# Patient Record
Sex: FEMALE | Race: ASIAN | NOT HISPANIC OR LATINO | Employment: OTHER | URBAN - METROPOLITAN AREA
[De-identification: names, ages, dates, MRNs, and addresses within clinical notes are randomized per-mention and may not be internally consistent; named-entity substitution may affect disease eponyms.]

---

## 2017-07-19 ENCOUNTER — OFFICE VISIT (OUTPATIENT)
Dept: OTOLARYNGOLOGY | Facility: CLINIC | Age: 62
End: 2017-07-19
Payer: MEDICARE

## 2017-07-19 ENCOUNTER — CLINICAL SUPPORT (OUTPATIENT)
Dept: AUDIOLOGY | Facility: CLINIC | Age: 62
End: 2017-07-19
Payer: MEDICARE

## 2017-07-19 VITALS — BODY MASS INDEX: 24.14 KG/M2 | WEIGHT: 131.19 LBS | HEIGHT: 62 IN

## 2017-07-19 DIAGNOSIS — H90.12 CONDUCTIVE HEARING LOSS OF LEFT EAR WITH UNRESTRICTED HEARING OF RIGHT EAR: Primary | ICD-10-CM

## 2017-07-19 DIAGNOSIS — H66.92 CHRONIC OTITIS MEDIA OF LEFT EAR: Primary | ICD-10-CM

## 2017-07-19 PROCEDURE — 99204 OFFICE O/P NEW MOD 45 MIN: CPT | Mod: S$PBB,,, | Performed by: OTOLARYNGOLOGY

## 2017-07-19 PROCEDURE — 99999 PR PBB SHADOW E&M-EST. PATIENT-LVL III: CPT | Mod: PBBFAC,,, | Performed by: OTOLARYNGOLOGY

## 2017-07-19 PROCEDURE — 99213 OFFICE O/P EST LOW 20 MIN: CPT | Mod: PBBFAC | Performed by: OTOLARYNGOLOGY

## 2017-07-19 RX ORDER — CALCITRIOL 0.5 UG/1
0.5 CAPSULE ORAL EVERY MORNING
COMMUNITY

## 2017-07-19 RX ORDER — CARVEDILOL 6.25 MG/1
6.25 TABLET ORAL 2 TIMES DAILY WITH MEALS
COMMUNITY

## 2017-07-19 RX ORDER — SEVELAMER HYDROCHLORIDE 800 MG/1
800 TABLET, FILM COATED ORAL
COMMUNITY

## 2017-07-19 RX ORDER — AMITRIPTYLINE HYDROCHLORIDE 25 MG/1
25 TABLET, FILM COATED ORAL NIGHTLY PRN
COMMUNITY

## 2017-07-19 RX ORDER — AMLODIPINE BESYLATE 10 MG/1
10 TABLET ORAL EVERY MORNING
COMMUNITY

## 2017-07-19 NOTE — LETTER
July 19, 2017      Rubén Burton MD  [address] [facility]  [address]  [facility] [address]           [facility] - Otorhinolaryngology  [address]  [facility] [address]  Phone: [phone]  Fax: [phone]          Patient: Tor Perez   MR Number: [id number]   YOB: 1955   Date of Visit: 7/19/2017       Dear Dr. Rubén Burton:    Thank you for referring Tor Perez to me for evaluation. Attached you will find relevant portions of my assessment and plan of care.    If you have questions, please do not hesitate to call me. I look forward to following Tor Perez along with you.    Sincerely,    Reji Shetty MD    Enclosure  CC:  No Recipients    If you would like to receive this communication electronically, please contact [email or web address] or [phone] to request more information on [facility] access.    For providers and/or their staff who would like to refer a patient to [facility], please contact us through our one-stop-shop provider referral line, [facility], at [phone].    If you feel you have received this communication in error or would no longer like to receive these types of communications, please e-mail [email or web address]

## 2017-07-19 NOTE — PROGRESS NOTES
Subjective:       Patient ID: Tor Perez is a 61 y.o. female.    Chief Complaint: Otitis Media    HPI: Ref Dr Burton.    Long Hx of L COM.    Unable to insufflate AS.    Gets painful rec L otorrhea.    Wants it repaired.    CT done in May during active drainage.    Hx of Kindey transplant/ DM.    Sees MD's in [facility].    Past Medical History: Patient has a past medical history of Disorder of kidney and ureter; Hypertension; and Stroke.    Past Surgical History: Patient has a past surgical history that includes Kidney transplant.    Social History: Patient reports that she has never smoked. She does not have any smokeless tobacco history on file. She reports that she does not drink alcohol.    Family History: family history is not on file.    Medications:   Current Outpatient Prescriptions   Medication Sig    amitriptyline (ELAVIL) 25 MG tablet Take 25 mg by mouth nightly as needed for Insomnia.    amlodipine (NORVASC) 10 MG tablet Take 10 mg by mouth once daily.    calcitRIOL (ROCALTROL) 0.5 MCG Cap Take 0.5 mcg by mouth once daily.    carvedilol (COREG) 6.25 MG tablet Take 6.25 mg by mouth 2 (two) times daily with meals.    sevelamer HCl (RENAGEL) 800 MG Tab Take 800 mg by mouth 3 (three) times daily with meals.     No current facility-administered medications for this visit.        Allergies: Patient has No Known Allergies.    Review of Systems   Constitutional: Negative for activity change, appetite change, chills, diaphoresis, fatigue, fever and unexpected weight change.   HENT: Positive for ear pain and hearing loss. Negative for congestion, ear discharge, facial swelling, nosebleeds, postnasal drip, rhinorrhea, sinus pressure, sneezing, sore throat, tinnitus, trouble swallowing and voice change.    Eyes: Negative for photophobia, pain, discharge, redness and visual disturbance.   Respiratory: Negative for cough, chest tightness, shortness of breath and wheezing.    Cardiovascular: Negative for chest  pain and palpitations.   Gastrointestinal: Negative for abdominal pain, constipation, diarrhea and nausea.   Genitourinary: Negative for dysuria and frequency.   Musculoskeletal: Negative for arthralgias, back pain, gait problem, joint swelling, myalgias, neck pain and neck stiffness.   Skin: Negative for color change, pallor and rash.   Neurological: Negative for dizziness, tremors, seizures, syncope, facial asymmetry, speech difficulty, weakness, light-headedness, numbness and headaches.   Hematological: Negative for adenopathy. Does not bruise/bleed easily.   Psychiatric/Behavioral: Negative for agitation, confusion, decreased concentration, dysphoric mood and sleep disturbance. The patient is not nervous/anxious and is not hyperactive.        Objective:      Physical Exam   Constitutional: She is oriented to person, place, and time. She appears well-developed and well-nourished. She is cooperative.  Non-toxic appearance. She does not have a sickly appearance. She does not appear ill. No distress.   HENT:   Head: Normocephalic and atraumatic. Not macrocephalic and not microcephalic. Head is without raccoon's eyes, without Acosta's sign, without abrasion, without contusion, without laceration, without right periorbital erythema and without left periorbital erythema. Hair is normal.   Right Ear: Ear canal normal. No lacerations. No drainage, swelling or tenderness. No foreign bodies. No mastoid tenderness. Tympanic membrane is not injected, not scarred, not perforated, not erythematous, not retracted and not bulging. Tympanic membrane mobility is normal. No middle ear effusion. No hemotympanum. Decreased hearing is noted.   Left Ear: Ear canal normal. No lacerations. No drainage, swelling or tenderness. No foreign bodies. No mastoid tenderness. Tympanic membrane is scarred and perforated. Tympanic membrane is not injected, not erythematous, not retracted and not bulging. Tympanic membrane mobility is normal.  No  middle ear effusion. No hemotympanum. Decreased hearing is noted.   Ears:    Nose: No mucosal edema, rhinorrhea, nose lacerations, sinus tenderness, nasal deformity, septal deviation or nasal septal hematoma. No epistaxis.  No foreign bodies. Right sinus exhibits no maxillary sinus tenderness. Left sinus exhibits no maxillary sinus tenderness.       CT from May with some scant fluid in ME/ air cells.    Sclerosis.       Eyes: Conjunctivae, EOM and lids are normal. Pupils are equal, round, and reactive to light.   Neck: Normal range of motion. Neck supple. No JVD present. No tracheal tenderness and no muscular tenderness present. No neck rigidity. No tracheal deviation, no edema, no erythema and normal range of motion present. No thyroid mass and no thyromegaly present.   Cardiovascular: Normal rate and regular rhythm.    Pulmonary/Chest: Effort normal. No accessory muscle usage or stridor. No apnea, no tachypnea and no bradypnea. No respiratory distress.   Abdominal: Soft. Normal appearance.   Musculoskeletal: Normal range of motion.   Lymphadenopathy:        Head (right side): No submental, no submandibular, no tonsillar, no preauricular and no posterior auricular adenopathy present.        Head (left side): No submental, no submandibular, no tonsillar, no preauricular and no posterior auricular adenopathy present.     She has no cervical adenopathy.        Right cervical: No superficial cervical, no deep cervical and no posterior cervical adenopathy present.       Left cervical: No superficial cervical, no deep cervical and no posterior cervical adenopathy present.   Neurological: She is alert and oriented to person, place, and time. She displays no atrophy and no tremor. No cranial nerve deficit or sensory deficit. She exhibits normal muscle tone. She displays no seizure activity.   Skin: Skin is warm, dry and intact. No abrasion, no bruising, no burn, no ecchymosis, no laceration, no lesion and no rash noted.  She is not diaphoretic. No erythema. No pallor.   Psychiatric: She has a normal mood and affect. Her behavior is normal. Judgment and thought content normal. Her speech is not rapid and/or pressured and not slurred. Cognition and memory are normal. She is communicative.   Nursing note and vitals reviewed.              Assessment:       1. Chronic otitis media of left ear     2. ? ETD AS  Plan:         Discussed L Tplasty with pt and family.  They desire correction.    OP / Gen    Lat graft.    Some risk of ETD/ need for PET.    Needs pre op clearance for Med issues.    I have explained the risks , benefits and alternatives of the procedure in detail. This includes infection, bleeding, further loss of hearing,tinnitus, failure to improve, chorda symptoms, dizziness and facial nerve problems. All questions have been answered.  The patient desires to proceed.

## 2017-07-19 NOTE — PROGRESS NOTES
Borderline normal to mild sensorineural hearing loss with type A tympanogram in the right ear.  Moderate mixed hearing loss with a flat, large ear canal volume tympanogram in the left ear.  Patient does not speak enough English to do accurate word discrimination testing.

## 2017-07-21 ENCOUNTER — TELEPHONE (OUTPATIENT)
Dept: OTOLARYNGOLOGY | Facility: CLINIC | Age: 62
End: 2017-07-21

## 2017-07-21 DIAGNOSIS — H66.92 LEFT OTITIS MEDIA WITH SPONTANEOUS RUPTURE OF EARDRUM: Primary | ICD-10-CM

## 2017-07-21 DIAGNOSIS — H72.92 LEFT OTITIS MEDIA WITH SPONTANEOUS RUPTURE OF EARDRUM: Primary | ICD-10-CM

## 2017-09-06 ENCOUNTER — ANESTHESIA EVENT (OUTPATIENT)
Dept: SURGERY | Facility: HOSPITAL | Age: 62
End: 2017-09-06
Payer: MEDICARE

## 2017-09-06 ENCOUNTER — TELEPHONE (OUTPATIENT)
Dept: OTOLARYNGOLOGY | Facility: CLINIC | Age: 62
End: 2017-09-06

## 2017-09-06 NOTE — ANESTHESIA PREPROCEDURE EVALUATION
09/06/2017  Pre-operative evaluation for Procedure(s) (LRB):  TYMPANOPLASTY (Left)    Tor Perez is a 61 y.o. female with PMH of HTN, stroke, s/p kidney transplant who presents for above procedure.     LDA:     Prev airway: None on file     Drips:     There is no problem list on file for this patient.      Review of patient's allergies indicates:  No Known Allergies     No current facility-administered medications on file prior to encounter.      Current Outpatient Prescriptions on File Prior to Encounter   Medication Sig Dispense Refill    amitriptyline (ELAVIL) 25 MG tablet Take 25 mg by mouth nightly as needed for Insomnia.      amlodipine (NORVASC) 10 MG tablet Take 10 mg by mouth every morning.       calcitRIOL (ROCALTROL) 0.5 MCG Cap Take 0.5 mcg by mouth every morning.       carvedilol (COREG) 6.25 MG tablet Take 6.25 mg by mouth 2 (two) times daily with meals.      sevelamer HCl (RENAGEL) 800 MG Tab Take 800 mg by mouth 3 (three) times daily with meals.         Past Surgical History:   Procedure Laterality Date    KIDNEY TRANSPLANT         Social History     Social History    Marital status:      Spouse name: N/A    Number of children: N/A    Years of education: N/A     Occupational History    Not on file.     Social History Main Topics    Smoking status: Never Smoker    Smokeless tobacco: Not on file    Alcohol use No    Drug use: Unknown    Sexual activity: Not on file     Other Topics Concern    Not on file     Social History Narrative    No narrative on file         Vital Signs Range (Last 24H):         CBC: No results for input(s): WBC, RBC, HGB, HCT, PLT, MCV, MCH, MCHC in the last 72 hours.    CMP: No results for input(s): NA, K, CL, CO2, BUN, CREATININE, GLU, MG, PHOS, CALCIUM, ALBUMIN, PROT, ALKPHOS, ALT, AST, BILITOT in the last 72 hours.    INR  No results for  input(s): INR, PROTIME, APTT in the last 72 hours.    Invalid input(s): PT        Diagnostic Studies:    CT orbits 05/22/17:   1.  There is trace fluid and mucosal thickening of the paranasal sinuses demonstrated as well as inferior posterior aspect of master air cells on the left.  2.  There is relatively symmetric overall appearance of the external auditory canals with some marginal soft tissue thickening at the medial most aspect on the left.  3.  The right tympanic membrane appears grossly intact overall.  4.  There is incomplete visualization of the left tympanic membrane with adjacent thickening and soft tissue attenuation along with some minimal fluid at the inferior aspect of the middle auditory meatus and abutting the inferior lateral aspect of the ossicles asymmetric as compared to the right.  This may correspond to the clinical history provided as could be seen with sequela of previous injury or inflammation.    EKG: None       2D Echo: None        Anesthesia Evaluation    I have reviewed the Patient Summary Reports.        Review of Systems  Anesthesia Hx:  History of prior surgery of interest to airway management or planning: Denies Family Hx of Anesthesia complications.   Denies Personal Hx of Anesthesia complications.   Social:  Non-Smoker    Cardiovascular:   Hypertension    Neurological:   CVA        Physical Exam  General:  Well nourished    Airway/Jaw/Neck:  Airway Findings: Mouth Opening: Normal Tongue: Normal  General Airway Assessment: Adult  Mallampati: II  TM Distance: 4 - 6 cm      Dental:  Dental Findings: In tact   Chest/Lungs:  Chest/Lungs Clear    Heart/Vascular:  Heart Findings: Normal Heart murmur: negative       Mental Status:  Mental Status Findings: Normal        Anesthesia Plan  Type of Anesthesia, risks & benefits discussed:  Anesthesia Type:  general  Patient's Preference:   Intra-op Monitoring Plan: standard ASA monitors  Intra-op Monitoring Plan Comments:   Post Op Pain Control  Plan: IV/PO Opioids PRN  Post Op Pain Control Plan Comments:   Induction:   IV  Beta Blocker:  Patient is on a Beta-Blocker and has received one dose within the past 24 hours (No further documentation required).       Informed Consent: Patient understands risks and agrees with Anesthesia plan.  Questions answered. Anesthesia consent signed with patient.  ASA Score: 2     Day of Surgery Review of History & Physical:    H&P update referred to the surgeon.     Anesthesia Plan Notes: On peritoneal dialysis, was dialyzed overnight.         Ready For Surgery From Anesthesia Perspective.

## 2017-09-06 NOTE — PRE-PROCEDURE INSTRUCTIONS
Spoke with Patient's Daughter.  NPO, medication, and pre-op instructions reviewed.  Denies previous problems with Anesthesia.  Patient speaks Cameroonian -  requested.  Daughter does not know the dose of her Sensipar off hand.  Daughter verbalized understanding of instructions.

## 2017-09-07 ENCOUNTER — SURGERY (OUTPATIENT)
Age: 62
End: 2017-09-07

## 2017-09-07 ENCOUNTER — ANESTHESIA (OUTPATIENT)
Dept: SURGERY | Facility: HOSPITAL | Age: 62
End: 2017-09-07
Payer: MEDICARE

## 2017-09-07 ENCOUNTER — HOSPITAL ENCOUNTER (OUTPATIENT)
Facility: HOSPITAL | Age: 62
Discharge: HOME | End: 2017-09-07
Attending: OTOLARYNGOLOGY | Admitting: OTOLARYNGOLOGY
Payer: MEDICARE

## 2017-09-07 VITALS
OXYGEN SATURATION: 96 % | HEIGHT: 62 IN | WEIGHT: 130 LBS | DIASTOLIC BLOOD PRESSURE: 56 MMHG | TEMPERATURE: 97 F | SYSTOLIC BLOOD PRESSURE: 125 MMHG | RESPIRATION RATE: 14 BRPM | BODY MASS INDEX: 23.92 KG/M2 | HEART RATE: 67 BPM

## 2017-09-07 DIAGNOSIS — H72.90 TYMPANIC MEMBRANE PERFORATION: ICD-10-CM

## 2017-09-07 DIAGNOSIS — H72.90 TYMPANIC MEMBRANE PERFORATION, UNSPECIFIED LATERALITY: Primary | ICD-10-CM

## 2017-09-07 DIAGNOSIS — H72.92 TYMPANIC MEMBRANE PERFORATION, LEFT: ICD-10-CM

## 2017-09-07 LAB
POCT GLUCOSE: 130 MG/DL (ref 70–110)
POCT GLUCOSE: 132 MG/DL (ref 70–110)

## 2017-09-07 PROCEDURE — 71000033 HC RECOVERY, INTIAL HOUR: Performed by: OTOLARYNGOLOGY

## 2017-09-07 PROCEDURE — 27201423 OPTIME MED/SURG SUP & DEVICES STERILE SUPPLY: Performed by: OTOLARYNGOLOGY

## 2017-09-07 PROCEDURE — 82962 GLUCOSE BLOOD TEST: CPT | Performed by: OTOLARYNGOLOGY

## 2017-09-07 PROCEDURE — 63600175 PHARM REV CODE 636 W HCPCS: Performed by: STUDENT IN AN ORGANIZED HEALTH CARE EDUCATION/TRAINING PROGRAM

## 2017-09-07 PROCEDURE — 25000003 PHARM REV CODE 250: Performed by: OTOLARYNGOLOGY

## 2017-09-07 PROCEDURE — 36000708 HC OR TIME LEV III 1ST 15 MIN: Performed by: OTOLARYNGOLOGY

## 2017-09-07 PROCEDURE — 71000015 HC POSTOP RECOV 1ST HR: Performed by: OTOLARYNGOLOGY

## 2017-09-07 PROCEDURE — 71000016 HC POSTOP RECOV ADDL HR: Performed by: OTOLARYNGOLOGY

## 2017-09-07 PROCEDURE — 37000008 HC ANESTHESIA 1ST 15 MINUTES: Performed by: OTOLARYNGOLOGY

## 2017-09-07 PROCEDURE — D9220A PRA ANESTHESIA: Mod: ,,, | Performed by: ANESTHESIOLOGY

## 2017-09-07 PROCEDURE — 36000709 HC OR TIME LEV III EA ADD 15 MIN: Performed by: OTOLARYNGOLOGY

## 2017-09-07 PROCEDURE — 25000003 PHARM REV CODE 250: Performed by: STUDENT IN AN ORGANIZED HEALTH CARE EDUCATION/TRAINING PROGRAM

## 2017-09-07 PROCEDURE — 37000009 HC ANESTHESIA EA ADD 15 MINS: Performed by: OTOLARYNGOLOGY

## 2017-09-07 PROCEDURE — 69631 REPAIR EARDRUM STRUCTURES: CPT | Mod: LT,GC,, | Performed by: OTOLARYNGOLOGY

## 2017-09-07 RX ORDER — FENTANYL CITRATE 50 UG/ML
25 INJECTION, SOLUTION INTRAMUSCULAR; INTRAVENOUS EVERY 5 MIN PRN
Status: DISCONTINUED | OUTPATIENT
Start: 2017-09-07 | End: 2017-09-07 | Stop reason: HOSPADM

## 2017-09-07 RX ORDER — SODIUM CHLORIDE 9 MG/ML
INJECTION, SOLUTION INTRAVENOUS CONTINUOUS PRN
Status: DISCONTINUED | OUTPATIENT
Start: 2017-09-07 | End: 2017-09-07

## 2017-09-07 RX ORDER — ONDANSETRON 2 MG/ML
4 INJECTION INTRAMUSCULAR; INTRAVENOUS DAILY PRN
Status: DISCONTINUED | OUTPATIENT
Start: 2017-09-07 | End: 2017-09-07 | Stop reason: HOSPADM

## 2017-09-07 RX ORDER — ROCURONIUM BROMIDE 10 MG/ML
INJECTION, SOLUTION INTRAVENOUS
Status: DISCONTINUED | OUTPATIENT
Start: 2017-09-07 | End: 2017-09-07

## 2017-09-07 RX ORDER — NEOMYCIN SULFATE, POLYMYXIN B SULFATE AND HYDROCORTISONE 10; 3.5; 1 MG/ML; MG/ML; [USP'U]/ML
SUSPENSION/ DROPS AURICULAR (OTIC)
Status: DISCONTINUED | OUTPATIENT
Start: 2017-09-07 | End: 2017-09-07 | Stop reason: HOSPADM

## 2017-09-07 RX ORDER — GLYCOPYRROLATE 0.2 MG/ML
INJECTION INTRAMUSCULAR; INTRAVENOUS
Status: DISCONTINUED | OUTPATIENT
Start: 2017-09-07 | End: 2017-09-07

## 2017-09-07 RX ORDER — AMOXICILLIN 500 MG/1
500 TABLET, FILM COATED ORAL EVERY 12 HOURS
Qty: 20 TABLET | Refills: 0 | Status: SHIPPED | OUTPATIENT
Start: 2017-09-07 | End: 2017-09-17

## 2017-09-07 RX ORDER — OXYCODONE AND ACETAMINOPHEN 7.5; 325 MG/1; MG/1
1 TABLET ORAL EVERY 4 HOURS PRN
Qty: 30 TABLET | Refills: 0 | Status: SHIPPED | OUTPATIENT
Start: 2017-09-07

## 2017-09-07 RX ORDER — HYDROMORPHONE HYDROCHLORIDE 1 MG/ML
1 INJECTION, SOLUTION INTRAMUSCULAR; INTRAVENOUS; SUBCUTANEOUS EVERY 4 HOURS PRN
Status: DISCONTINUED | OUTPATIENT
Start: 2017-09-07 | End: 2017-09-07 | Stop reason: HOSPADM

## 2017-09-07 RX ORDER — MECLIZINE HYDROCHLORIDE 25 MG/1
25 TABLET ORAL 3 TIMES DAILY PRN
Qty: 30 TABLET | Refills: 0 | Status: SHIPPED | OUTPATIENT
Start: 2017-09-07

## 2017-09-07 RX ORDER — PROMETHAZINE HYDROCHLORIDE 25 MG/1
25 TABLET ORAL EVERY 4 HOURS PRN
Qty: 20 TABLET | Refills: 0 | Status: SHIPPED | OUTPATIENT
Start: 2017-09-07

## 2017-09-07 RX ORDER — SODIUM CHLORIDE 0.9 % (FLUSH) 0.9 %
3 SYRINGE (ML) INJECTION
Status: DISCONTINUED | OUTPATIENT
Start: 2017-09-07 | End: 2017-09-07 | Stop reason: HOSPADM

## 2017-09-07 RX ORDER — HYDROCODONE BITARTRATE AND ACETAMINOPHEN 5; 325 MG/1; MG/1
1 TABLET ORAL EVERY 4 HOURS PRN
Status: DISCONTINUED | OUTPATIENT
Start: 2017-09-07 | End: 2017-09-07 | Stop reason: HOSPADM

## 2017-09-07 RX ORDER — PROPOFOL 10 MG/ML
VIAL (ML) INTRAVENOUS
Status: DISCONTINUED | OUTPATIENT
Start: 2017-09-07 | End: 2017-09-07

## 2017-09-07 RX ORDER — PHENYLEPHRINE HYDROCHLORIDE 10 MG/ML
INJECTION INTRAVENOUS
Status: DISCONTINUED | OUTPATIENT
Start: 2017-09-07 | End: 2017-09-07

## 2017-09-07 RX ORDER — SUCCINYLCHOLINE CHLORIDE 20 MG/ML
INJECTION INTRAMUSCULAR; INTRAVENOUS
Status: DISCONTINUED | OUTPATIENT
Start: 2017-09-07 | End: 2017-09-07

## 2017-09-07 RX ORDER — MIDAZOLAM HYDROCHLORIDE 1 MG/ML
INJECTION, SOLUTION INTRAMUSCULAR; INTRAVENOUS
Status: DISCONTINUED | OUTPATIENT
Start: 2017-09-07 | End: 2017-09-07

## 2017-09-07 RX ORDER — ONDANSETRON 2 MG/ML
INJECTION INTRAMUSCULAR; INTRAVENOUS
Status: DISCONTINUED | OUTPATIENT
Start: 2017-09-07 | End: 2017-09-07

## 2017-09-07 RX ORDER — NEOMYCIN SULFATE, POLYMYXIN B SULFATE, HYDROCORTISONE 3.5; 10000; 1 MG/ML; [USP'U]/ML; MG/ML
3 SOLUTION/ DROPS AURICULAR (OTIC) EVERY 8 HOURS
Qty: 10 ML | Refills: 0 | Status: SHIPPED | OUTPATIENT
Start: 2017-09-15 | End: 2017-09-25

## 2017-09-07 RX ORDER — FENTANYL CITRATE 50 UG/ML
INJECTION, SOLUTION INTRAMUSCULAR; INTRAVENOUS
Status: DISCONTINUED | OUTPATIENT
Start: 2017-09-07 | End: 2017-09-07

## 2017-09-07 RX ORDER — LIDOCAINE HYDROCHLORIDE AND EPINEPHRINE 20; 10 MG/ML; UG/ML
INJECTION, SOLUTION INFILTRATION; PERINEURAL
Status: DISCONTINUED | OUTPATIENT
Start: 2017-09-07 | End: 2017-09-07 | Stop reason: HOSPADM

## 2017-09-07 RX ORDER — NEOSTIGMINE METHYLSULFATE 1 MG/ML
INJECTION, SOLUTION INTRAVENOUS
Status: DISCONTINUED | OUTPATIENT
Start: 2017-09-07 | End: 2017-09-07

## 2017-09-07 RX ORDER — NEOMYCIN SULFATE, POLYMYXIN B SULFATE, HYDROCORTISONE 3.5; 10000; 1 MG/ML; [USP'U]/ML; MG/ML
3 SOLUTION/ DROPS AURICULAR (OTIC) EVERY 8 HOURS
Qty: 10 ML | Refills: 0 | Status: SHIPPED | OUTPATIENT
Start: 2017-09-15 | End: 2017-09-07

## 2017-09-07 RX ORDER — LIDOCAINE HCL/PF 100 MG/5ML
SYRINGE (ML) INTRAVENOUS
Status: DISCONTINUED | OUTPATIENT
Start: 2017-09-07 | End: 2017-09-07

## 2017-09-07 RX ORDER — CEFAZOLIN SODIUM 2 G/50ML
2 SOLUTION INTRAVENOUS
Status: DISCONTINUED | OUTPATIENT
Start: 2017-09-07 | End: 2017-09-07 | Stop reason: HOSPADM

## 2017-09-07 RX ADMIN — GLYCOPYRROLATE 0.6 MG: 0.2 INJECTION, SOLUTION INTRAMUSCULAR; INTRAVENOUS at 11:09

## 2017-09-07 RX ADMIN — EPHEDRINE SULFATE 5 MG: 50 INJECTION, SOLUTION INTRAMUSCULAR; INTRAVENOUS; SUBCUTANEOUS at 10:09

## 2017-09-07 RX ADMIN — SUCCINYLCHOLINE CHLORIDE 120 MG: 20 INJECTION, SOLUTION INTRAMUSCULAR; INTRAVENOUS at 09:09

## 2017-09-07 RX ADMIN — ROCURONIUM BROMIDE 10 MG: 10 INJECTION, SOLUTION INTRAVENOUS at 10:09

## 2017-09-07 RX ADMIN — FENTANYL CITRATE 100 MCG: 50 INJECTION, SOLUTION INTRAMUSCULAR; INTRAVENOUS at 09:09

## 2017-09-07 RX ADMIN — ROCURONIUM BROMIDE 25 MG: 10 INJECTION, SOLUTION INTRAVENOUS at 10:09

## 2017-09-07 RX ADMIN — ONDANSETRON 4 MG: 2 INJECTION INTRAMUSCULAR; INTRAVENOUS at 11:09

## 2017-09-07 RX ADMIN — ROCURONIUM BROMIDE 5 MG: 10 INJECTION, SOLUTION INTRAVENOUS at 09:09

## 2017-09-07 RX ADMIN — LIDOCAINE HYDROCHLORIDE 100 MG: 20 INJECTION, SOLUTION INTRAVENOUS at 09:09

## 2017-09-07 RX ADMIN — PHENYLEPHRINE HYDROCHLORIDE 100 MCG: 10 INJECTION INTRAVENOUS at 10:09

## 2017-09-07 RX ADMIN — NEOSTIGMINE METHYLSULFATE 5 MG: 1 INJECTION INTRAVENOUS at 11:09

## 2017-09-07 RX ADMIN — LIDOCAINE HYDROCHLORIDE AND EPINEPHRINE 5 ML: 20; 10 INJECTION, SOLUTION INFILTRATION; PERINEURAL at 10:09

## 2017-09-07 RX ADMIN — CEFAZOLIN SODIUM 2 G: 2 SOLUTION INTRAVENOUS at 10:09

## 2017-09-07 RX ADMIN — PROPOFOL 140 MG: 10 INJECTION, EMULSION INTRAVENOUS at 09:09

## 2017-09-07 RX ADMIN — NEOMYCIN SULFATE, POLYMYXIN B SULFATE AND HYDROCORTISONE 4 DROP: 10; 3.5; 1 SUSPENSION/ DROPS AURICULAR (OTIC) at 09:09

## 2017-09-07 RX ADMIN — SODIUM CHLORIDE, SODIUM GLUCONATE, SODIUM ACETATE, POTASSIUM CHLORIDE, MAGNESIUM CHLORIDE, SODIUM PHOSPHATE, DIBASIC, AND POTASSIUM PHOSPHATE: .53; .5; .37; .037; .03; .012; .00082 INJECTION, SOLUTION INTRAVENOUS at 11:09

## 2017-09-07 RX ADMIN — SODIUM CHLORIDE: 0.9 INJECTION, SOLUTION INTRAVENOUS at 09:09

## 2017-09-07 RX ADMIN — MIDAZOLAM HYDROCHLORIDE 1 MG: 1 INJECTION, SOLUTION INTRAMUSCULAR; INTRAVENOUS at 09:09

## 2017-09-07 RX ADMIN — HYDROCODONE BITARTRATE AND ACETAMINOPHEN 1 TABLET: 5; 325 TABLET ORAL at 12:09

## 2017-09-07 RX ADMIN — EPHEDRINE SULFATE 10 MG: 50 INJECTION, SOLUTION INTRAMUSCULAR; INTRAVENOUS; SUBCUTANEOUS at 11:09

## 2017-09-07 RX ADMIN — FENTANYL CITRATE 50 MCG: 50 INJECTION, SOLUTION INTRAMUSCULAR; INTRAVENOUS at 10:09

## 2017-09-07 RX ADMIN — GELATIN ABSORBABLE SPONGE SIZE 100 1 APPLICATOR: MISC at 09:09

## 2017-09-07 NOTE — BRIEF OP NOTE
[facility]  Brief Operative Note     SUMMARY     Surgery Date: 9/7/2017     Surgeon(s) and Role:     * Reji Shetty MD - Primary     * Brooke Villalba MD - Resident - Assisting      Pre-op Diagnosis:  Left otitis media with spontaneous rupture of eardrum [H66.92, H72.92]    Post-op Diagnosis:  Post-Op Diagnosis Codes:     * Left otitis media with spontaneous rupture of eardrum [H66.92, H72.92]    Procedure(s) (LRB):  TYMPANOPLASTY (Left)    Anesthesia: General    Description of the findings of the procedure: see operative note    Estimated Blood Loss: 20         Specimens:   Specimen (12h ago through future)    None          Discharge Note    SUMMARY     Admit Date: 9/7/2017    Discharge Date and Time:  09/07/2017 9:37 AM    Hospital Course (synopsis of major diagnoses, care, treatment, and services provided during the course of the hospital stay): 61 y.o. female admitted for above procedures, tolerated well. Patient was transferred to PACU for recovery. After an appropriate period under direct observation, patient will be discharged home with pain medication, phenergan, meclizine, and antibiotics.        Final Diagnosis: Post-Op Diagnosis Codes:     * Left otitis media with spontaneous rupture of eardrum [H66.92, H72.92]    Disposition: Home or Self Care    Follow Up/Patient Instructions:     Medications:  Reconciled Home Medications:   Current Discharge Medication List      START taking these medications    Details   amoxicillin (AMOXIL) 500 MG Tab Take 1 tablet (500 mg total) by mouth every 12 (twelve) hours.  Qty: 20 tablet, Refills: 0      meclizine (ANTIVERT) 25 mg tablet Take 1 tablet (25 mg total) by mouth 3 (three) times daily as needed for Dizziness or Nausea.  Qty: 30 tablet, Refills: 0      oxycodone-acetaminophen (PERCOCET) 7.5-325 mg per tablet Take 1 tablet by mouth every 4 (four) hours as needed for Pain.  Qty: 30 tablet, Refills: 0      promethazine (PHENERGAN) 25 MG tablet  Take 1 tablet (25 mg total) by mouth every 4 (four) hours as needed for Nausea.  Qty: 20 tablet, Refills: 0         CONTINUE these medications which have NOT CHANGED    Details   amitriptyline (ELAVIL) 25 MG tablet Take 25 mg by mouth nightly as needed for Insomnia.      amlodipine (NORVASC) 10 MG tablet Take 10 mg by mouth every morning.       calcitRIOL (ROCALTROL) 0.5 MCG Cap Take 0.5 mcg by mouth every morning.       carvedilol (COREG) 6.25 MG tablet Take 6.25 mg by mouth 2 (two) times daily with meals.      CINACALCET HCL (SENSIPAR ORAL) Take 1 tablet by mouth nightly. Daughter does not know the strength      sevelamer HCl (RENAGEL) 800 MG Tab Take 800 mg by mouth 3 (three) times daily with meals.             Discharge Procedure Orders  Diet general     Weight bearing restrictions (specify)   Order Comments: Do not lift > 10 lb for 1 week. Slowly increase your activity to your baseline level. No stooping, straining, or strenuous exercise. However, walking is encouraged.     Call MD for:  severe uncontrolled pain     Call MD for:  persistent nausea and vomiting     Call MD for:  temperature >100.4     Call MD for:  difficulty breathing, headache or visual disturbances     Call MD for:  redness, tenderness, or signs of infection (pain, swelling, redness, odor or green/yellow discharge around incision site)     Leave dressing on - Keep it clean, dry, and intact until clinic visit   Order Comments: Do not shower       Follow-up Information     Reji Shetty MD. Go in 1 day.    Specialty:  Otolaryngology  Why:  Post-op visit, For wound re-check  Contact information:  Jj CAMARGO  [facility] [id number] [phone]

## 2017-09-07 NOTE — PLAN OF CARE
Patient arrived from PACU with SUKHJINDER Berman RN.  Patient stable.  Report received at this time.  Assumed care of patient at this time.

## 2017-09-07 NOTE — DISCHARGE INSTRUCTIONS
Dry ear precautions.  Do not allow ear to get wet.   Do not shower or wash hair.  Leave dressing in place until clinic visit with NP tomorrow (Arturo Piper).      Home Care Instructions For:  The Department of Ear, Nose, and Throat      Dr. SHERICE Shetty  EAR SURGERY    ACTIVITY LEVEL:  If you received sedation or an anesthetic, you may feel sleepy for several hours. Rest until you are more awake. Gradually resume your normal activities.    SPECIAL INSTRUCTIONS: Minor degrees of dizziness may be present on head motion and need not concern you unless it should increase.    DIET:  At home, continue with liquids, and if there is no nausea, you may eat a soft diet. Gradually resume your normal diet.    MEDICATIONS:  You will receive instructions for any pain and/or antibiotic prescriptions. Pain medication should be taken only if needed and as directed. Antibiotics should be taken as directed until the entire prescription is completed. If ordered, use ear drops as  Directed.    ADDITIONAL INFORMATION:     BATHING:  Do not get your ear wet until advised by your doctor. Until such time, when showering or washing the hair, cotton covered with Vaseline may be placed in the outer ear opening.    PRECAUTIONS:  1) Do not blow your nose until such time as your doctor has indicated that your ear is healed. If you must sneeze, please try to remember to sneeze with the mouth open.  2) Do not pop our ears by holding your nose and blowing air through the Eustachian tube into the ear.  3) Do not have dental work requiring drilling of the teeth until three weeks after surgery.  4) You may anticipate a certain amount of pulsation, popping, clicking, and other sounds in the ear and also feeling of fullness. Occasional sharp, shooting pains are not unusual. At times, it may feel as if there is liquid in the ear.  5) Consult your doctor before you travel by air.    DRESSING:  External dressings, if used, may not be removed or adjusted  until the morning after surgery by the doctor. Bloody, watery discharge may occur during healing.  The outer ear cotton may be changed if necessary. Expect hearing to be slightly worse temporarily, due to packing or tissue swelling. Improvement is very gradual.    WHEN TO CALL THE DOCTOR:   Any obvious bleeding   Fever over 101°F (38.4°C)   Persistent pain not relieved by pain medication   Purulent (pus drainage from ear.    RETURN APPOINTMENT:    FOR EMERGENCIES:  If any unusual problems or difficulties occur, contact Dr. Shetty or the resident at [phone] (page ) or the Clinic office, [phone].

## 2017-09-07 NOTE — OP NOTE
Pre op diagnosis: Chronic otitis media with tympanic membrane perforation/ Left    Post operative diagnosis: Same    Operative Procedure: Lateral graft tympanoplasty with use of operating microscope./Left    Surgeon: Dr. Reji Shetty    Assist:Deejay    9/7/17    Anesthesia: General endotracheal    Operative procedure in detail:    The patient was brought to the operating room and placed in the supine position. After general endotracheal anesthesia was induced the ear was prepped and draped in the usual fashion. The operating microscope was then brought onto the field and used for the remainder of the case. The ear canal and post auricular area were infiltrated with 1% xylocaine with 1/100,000 epinephrine solution. A vascular strip incision was made and then a post auricular incision was made and carried down the the level of the temporalis fascia. Temporalis fascia was harvested for later grafting. A C shaped incision was made on the mastoid periosteum posterior to the ear canal. Using a Angie periosteal elevator the vascular strip was elevated from behind forward. The ear canal was entered and Weitlaner retractors placed. The anterior canal wall skin was incised laterally and removed from the bony anterior canal wall and off of the fibrous remnant of the tympanic membrane. All epithelial remnants of the ear canal skin and tympanic membrane were removed.  The skin was set aside in a moist sponge for later replacement. The high speed drill was brought onto the field and using suction-irrigation an anterior bony canaloplasty was done to open and straighten the anterior canal wall. The middle ear was inspected and hemostasis was obtained with pinpoint electrocautery and removable gelfoam packing. The ossicular chain was inspected and found to be intact and mobile. The middle ear was filled inflammatory granulation tissue which was debrided, then it was filled with saline soaked gelfoam to the level of the  annular remnant. The fascia graft was trimmed to size with a superior slit and was passed medial the the malleus handle. The anterior border was positioned just lateral to the annular remnant and the posterior component draped onto the posterior canal wall. The anterior canal wall skin was the replaced onto the bony anterior ear canal slightly overlapping the previously placed fascia. The anterior angle was packed with dry compressed and cortisporin impregnated gelfoam. The vascular strip was then returned to its normal position and packed into place with cortisporin impregnated gelfoam. The ear was then turned and closed in layers including the periosteum and the skin with 3-0 interrupted vicryl suture. Steri strips and a sterile Jose Manuel dressing was applied. The procedure was terminated. The patient tolerated the procedure well and there were no intraoperative complications.

## 2017-09-07 NOTE — PROGRESS NOTES
Patient's daughter states patient feels dizzy and would like to wait until she feels a little less dizzy before she leaves. Will continue to monitor patient.

## 2017-09-07 NOTE — ANESTHESIA POSTPROCEDURE EVALUATION
"Anesthesia Post Evaluation    Patient: Tor Perez    Procedure(s) Performed: Procedure(s) (LRB):  TYMPANOPLASTY (Left)    Final Anesthesia Type: general  Patient location during evaluation: [facility]  Patient participation: Yes- Able to Participate  Level of consciousness: awake and alert and oriented  Post-procedure vital signs: reviewed and stable  Pain management: adequate  Airway patency: patent  PONV status at discharge: No PONV  Anesthetic complications: no      Cardiovascular status: blood pressure returned to baseline  Respiratory status: unassisted  Hydration status: euvolemic  Follow-up not needed.        Visit Vitals  BP (!) 125/56 (BP Location: Right arm, Patient Position: Lying)   Pulse 67   Temp 36.3 °C (97.4 °F) (Oral)   Resp 14   Ht 5' 2" (1.575 m)   Wt 59 kg (130 lb)   SpO2 96%   Breastfeeding? No   BMI 23.78 kg/m²       Pain/Raudel Score: Pain Assessment Performed: Yes (9/7/2017  1:40 PM)  Presence of Pain: denies (9/7/2017  1:40 PM)  Pain Rating Prior to Med Admin: 0 (9/7/2017 12:53 PM)  Pain Rating Post Med Admin: 0 (9/7/2017  1:00 PM)  Raudel Score: 10 (9/7/2017  1:40 PM)      "

## 2017-09-07 NOTE — PLAN OF CARE
Vital signs stable. Pt remains on room air. Pt denies pain or nausea. Awaiting transfer to phase II.

## 2017-09-07 NOTE — H&P
Patient ID: Tor Perez is a 61 y.o. female.     Chief Complaint: Otitis Media     HPI: Ref  Colettereggie. Last seen in clinic July 19, 2017. Here for planned tympanoplasty, left.     Long Hx of L COM.     Unable to insufflate AS.     Gets painful rec L otorrhea.     Wants it repaired.     CT done in May during active drainage.     Hx of Kindey transplant/ DM.     Sees MD's in [facility].     Past Medical History: Patient has a past medical history of Disorder of kidney and ureter; Hypertension; and Stroke.     Past Surgical History: Patient has a past surgical history that includes Kidney transplant.     Social History: Patient reports that she has never smoked. She does not have any smokeless tobacco history on file. She reports that she does not drink alcohol.     Family History: family history is not on file.     Medications:        Current Outpatient Prescriptions   Medication Sig    amitriptyline (ELAVIL) 25 MG tablet Take 25 mg by mouth nightly as needed for Insomnia.    amlodipine (NORVASC) 10 MG tablet Take 10 mg by mouth once daily.    calcitRIOL (ROCALTROL) 0.5 MCG Cap Take 0.5 mcg by mouth once daily.    carvedilol (COREG) 6.25 MG tablet Take 6.25 mg by mouth 2 (two) times daily with meals.    sevelamer HCl (RENAGEL) 800 MG Tab Take 800 mg by mouth 3 (three) times daily with meals.      No current facility-administered medications for this visit.          Allergies: Patient has No Known Allergies.     Review of Systems   Constitutional: Negative for activity change, appetite change, chills, diaphoresis, fatigue, fever and unexpected weight change.   HENT: Positive for ear pain and hearing loss. Negative for congestion, ear discharge, facial swelling, nosebleeds, postnasal drip, rhinorrhea, sinus pressure, sneezing, sore throat, tinnitus, trouble swallowing and voice change.    Eyes: Negative for photophobia, pain, discharge, redness and visual disturbance.   Respiratory: Negative for cough, chest  tightness, shortness of breath and wheezing.    Cardiovascular: Negative for chest pain and palpitations.   Gastrointestinal: Negative for abdominal pain, constipation, diarrhea and nausea.   Genitourinary: Negative for dysuria and frequency.   Musculoskeletal: Negative for arthralgias, back pain, gait problem, joint swelling, myalgias, neck pain and neck stiffness.   Skin: Negative for color change, pallor and rash.   Neurological: Negative for dizziness, tremors, seizures, syncope, facial asymmetry, speech difficulty, weakness, light-headedness, numbness and headaches.   Hematological: Negative for adenopathy. Does not bruise/bleed easily.   Psychiatric/Behavioral: Negative for agitation, confusion, decreased concentration, dysphoric mood and sleep disturbance. The patient is not nervous/anxious and is not hyperactive.        Objective:      Physical Exam   Constitutional: She is oriented to person, place, and time. She appears well-developed and well-nourished. She is cooperative.  Non-toxic appearance. She does not have a sickly appearance. She does not appear ill. No distress.   HENT:   Head: Normocephalic and atraumatic. Not macrocephalic and not microcephalic. Head is without raccoon's eyes, without Acosta's sign, without abrasion, without contusion, without laceration, without right periorbital erythema and without left periorbital erythema. Hair is normal.   Right Ear: Ear canal normal. No lacerations. No drainage, swelling or tenderness. No foreign bodies. No mastoid tenderness. Tympanic membrane is not injected, not scarred, not perforated, not erythematous, not retracted and not bulging. Tympanic membrane mobility is normal. No middle ear effusion. No hemotympanum. Decreased hearing is noted.   Left Ear: Ear canal normal. No lacerations. No drainage, swelling or tenderness. No foreign bodies. No mastoid tenderness. Tympanic membrane is scarred and perforated. Tympanic membrane is not injected, not  erythematous, not retracted and not bulging. Tympanic membrane mobility is normal.  No middle ear effusion. No hemotympanum. Decreased hearing is noted.   Ears:    Nose: No mucosal edema, rhinorrhea, nose lacerations, sinus tenderness, nasal deformity, septal deviation or nasal septal hematoma. No epistaxis.  No foreign bodies. Right sinus exhibits no maxillary sinus tenderness. Left sinus exhibits no maxillary sinus tenderness.       CT from May with some scant fluid in ME/ air cells.    Sclerosis.       Eyes: Conjunctivae, EOM and lids are normal. Pupils are equal, round, and reactive to light.   Neck: Normal range of motion. Neck supple. No JVD present. No tracheal tenderness and no muscular tenderness present. No neck rigidity. No tracheal deviation, no edema, no erythema and normal range of motion present. No thyroid mass and no thyromegaly present.   Cardiovascular: Normal rate and regular rhythm.    Pulmonary/Chest: Effort normal. No accessory muscle usage or stridor. No apnea, no tachypnea and no bradypnea. No respiratory distress.   Abdominal: Soft. Normal appearance.   Musculoskeletal: Normal range of motion.   Lymphadenopathy:        Head (right side): No submental, no submandibular, no tonsillar, no preauricular and no posterior auricular adenopathy present.        Head (left side): No submental, no submandibular, no tonsillar, no preauricular and no posterior auricular adenopathy present.     She has no cervical adenopathy.        Right cervical: No superficial cervical, no deep cervical and no posterior cervical adenopathy present.       Left cervical: No superficial cervical, no deep cervical and no posterior cervical adenopathy present.   Neurological: She is alert and oriented to person, place, and time. She displays no atrophy and no tremor. No cranial nerve deficit or sensory deficit. She exhibits normal muscle tone. She displays no seizure activity.   Skin: Skin is warm, dry and intact. No  abrasion, no bruising, no burn, no ecchymosis, no laceration, no lesion and no rash noted. She is not diaphoretic. No erythema. No pallor.   Psychiatric: She has a normal mood and affect. Her behavior is normal. Judgment and thought content normal. Her speech is not rapid and/or pressured and not slurred. Cognition and memory are normal. She is communicative.   Nursing note and vitals reviewed.                 Assessment:       1. Chronic otitis media of left ear     2. ? ETD AS  Plan:         Discussed L Tplasty with pt and family.  They desire correction.     OP / Gen     Lat graft.     Some risk of ETD/ need for PET.     Needs pre op clearance for Med issues.     I have explained the risks , benefits and alternatives of the procedure in detail. This includes infection, bleeding, further loss of hearing,tinnitus, failure to improve, chorda symptoms, dizziness and facial nerve problems. All questions have been answered.  The patient desires to proceed.

## 2017-09-07 NOTE — PLAN OF CARE
Patient and patient's daughter received discharge instructions and prescriptions.  Patient's daughter verbalized understanding of all instructions given and all questions were addressed prior to patient's discharge.  Patient's vital signs are stable and within patient's baseline.  Patient tolerated clear liquids PO in PACU.  Patient denies pain.  Patient denies nausea and vomiting at this time.  Patient meets all criteria for discharge at this time.  All required consents present in patient's chart upon patient's discharge.

## 2017-09-07 NOTE — TRANSFER OF CARE
"Anesthesia Transfer of Care Note    Patient: Tor Perez    Procedure(s) Performed: Procedure(s) (LRB):  TYMPANOPLASTY (Left)    Patient location: PACU    Anesthesia Type: general    Transport from OR: Transported from OR on 6-10 L/min O2 by face mask with adequate spontaneous ventilation    Post pain: adequate analgesia    Post assessment: no apparent anesthetic complications    Post vital signs: stable    Level of consciousness: awake    Nausea/Vomiting: no nausea/vomiting    Complications: none    Transfer of care protocol was followed      Last vitals:   Visit Vitals  /62 (BP Location: Right arm, Patient Position: Lying)   Pulse 84   Temp 36.6 °C (97.9 °F) (Temporal)   Resp 16   Ht 5' 2" (1.575 m)   Wt 59 kg (130 lb)   SpO2 100%   Breastfeeding? No   BMI 23.78 kg/m²     "

## 2017-09-08 ENCOUNTER — OFFICE VISIT (OUTPATIENT)
Dept: OTOLARYNGOLOGY | Facility: CLINIC | Age: 62
End: 2017-09-08
Payer: MEDICARE

## 2017-09-08 VITALS
SYSTOLIC BLOOD PRESSURE: 149 MMHG | BODY MASS INDEX: 23.79 KG/M2 | DIASTOLIC BLOOD PRESSURE: 77 MMHG | WEIGHT: 130.06 LBS

## 2017-09-08 DIAGNOSIS — Z98.890 S/P TYMPANOPLASTY: Primary | ICD-10-CM

## 2017-09-08 PROCEDURE — 99213 OFFICE O/P EST LOW 20 MIN: CPT | Mod: PBBFAC | Performed by: NURSE PRACTITIONER

## 2017-09-08 PROCEDURE — 99999 PR PBB SHADOW E&M-EST. PATIENT-LVL III: CPT | Mod: PBBFAC,,, | Performed by: NURSE PRACTITIONER

## 2017-09-08 PROCEDURE — 99024 POSTOP FOLLOW-UP VISIT: CPT | Mod: ,,, | Performed by: NURSE PRACTITIONER

## 2017-09-08 NOTE — PROGRESS NOTES
One day S/P L Tympanoplasty.    No unusual bleeding or pain. No significant dizziness or vertigo. No nausea or vomiting.     Jose Manuel and dressings removed. No evidence of hematoma or seroma. Steristrips intact.  Facial nerve function normal. Packing dry and intact.     Routine re check in one week with appropriate water precautions.

## 2017-09-14 ENCOUNTER — OFFICE VISIT (OUTPATIENT)
Dept: OTOLARYNGOLOGY | Facility: CLINIC | Age: 62
End: 2017-09-14
Payer: MEDICARE

## 2017-09-14 VITALS
HEART RATE: 80 BPM | DIASTOLIC BLOOD PRESSURE: 76 MMHG | SYSTOLIC BLOOD PRESSURE: 130 MMHG | WEIGHT: 129.44 LBS | BODY MASS INDEX: 23.82 KG/M2 | HEIGHT: 62 IN

## 2017-09-14 DIAGNOSIS — Z98.890 S/P TYMPANOPLASTY: Primary | ICD-10-CM

## 2017-09-14 PROCEDURE — 99213 OFFICE O/P EST LOW 20 MIN: CPT | Mod: PBBFAC | Performed by: NURSE PRACTITIONER

## 2017-09-14 PROCEDURE — 99999 PR PBB SHADOW E&M-EST. PATIENT-LVL III: CPT | Mod: PBBFAC,,, | Performed by: NURSE PRACTITIONER

## 2017-09-14 PROCEDURE — 99024 POSTOP FOLLOW-UP VISIT: CPT | Mod: ,,, | Performed by: NURSE PRACTITIONER

## 2017-09-14 NOTE — PROGRESS NOTES
One Week S/P L Tympnaoplasty.    No unusual bleeding or pain. No significant dizziness or vertigo. No nausea or vomiting.     Steri strips removed. No evidence of hematoma or seroma. No evidence of infection. Packing is dry and intact.     Start ototopical drops and re check in three weeks.

## 2017-10-09 ENCOUNTER — OFFICE VISIT (OUTPATIENT)
Dept: OTOLARYNGOLOGY | Facility: CLINIC | Age: 62
End: 2017-10-09
Payer: MEDICARE

## 2017-10-09 VITALS — WEIGHT: 129.19 LBS | HEIGHT: 62 IN | BODY MASS INDEX: 23.77 KG/M2

## 2017-10-09 DIAGNOSIS — Z09 FOLLOW-UP EXAMINATION AFTER EAR SURGERY: Primary | ICD-10-CM

## 2017-10-09 PROCEDURE — 99024 POSTOP FOLLOW-UP VISIT: CPT | Mod: ,,, | Performed by: OTOLARYNGOLOGY

## 2017-10-09 PROCEDURE — 99999 PR PBB SHADOW E&M-EST. PATIENT-LVL III: CPT | Mod: PBBFAC,,, | Performed by: OTOLARYNGOLOGY

## 2017-10-09 PROCEDURE — 99213 OFFICE O/P EST LOW 20 MIN: CPT | Mod: PBBFAC | Performed by: OTOLARYNGOLOGY

## 2017-10-09 RX ORDER — NEOMYCIN SULFATE, POLYMYXIN B SULFATE, HYDROCORTISONE 3.5; 10000; 1 MG/ML; [USP'U]/ML; MG/ML
4 SOLUTION/ DROPS AURICULAR (OTIC) 2 TIMES DAILY
Qty: 10 ML | Refills: 5 | Status: SHIPPED | OUTPATIENT
Start: 2017-10-09 | End: 2017-10-30

## 2017-10-09 NOTE — PROGRESS NOTES
1 mo S/P L T plasty.    Pt doing well post op.  No unusual pain or drainage. No significant vertigo or nausea.    Post auricular incision healing well.    Packing vacuumed out of ear with microscope.    Graft intact and healing well.    Patient tolerated well.    RTC 3 weeks.    Continue ear drops as directed.

## 2017-11-20 ENCOUNTER — OFFICE VISIT (OUTPATIENT)
Dept: OTOLARYNGOLOGY | Facility: CLINIC | Age: 62
End: 2017-11-20
Payer: MEDICARE

## 2017-11-20 VITALS — WEIGHT: 127.88 LBS | BODY MASS INDEX: 23.39 KG/M2

## 2017-11-20 DIAGNOSIS — Z09 FOLLOW-UP EXAMINATION AFTER EAR SURGERY: Primary | ICD-10-CM

## 2017-11-20 PROCEDURE — 99999 PR PBB SHADOW E&M-EST. PATIENT-LVL III: CPT | Mod: PBBFAC,,, | Performed by: OTOLARYNGOLOGY

## 2017-11-20 PROCEDURE — 99213 OFFICE O/P EST LOW 20 MIN: CPT | Mod: PBBFAC | Performed by: OTOLARYNGOLOGY

## 2017-11-20 PROCEDURE — 99024 POSTOP FOLLOW-UP VISIT: CPT | Mod: ,,, | Performed by: OTOLARYNGOLOGY

## 2017-11-20 NOTE — PROGRESS NOTES
2 mos S/P L T plaasty.    Pt doing well post op.  No unusual pain or drainage. No significant vertigo or nausea.    Post auricular incision healing well.    Packing vacuumed out of ear with microscope.    Graft intact and healing well.    Patient tolerated well.    RTC 2 mos    Continue ear drops as directed.Audio then.

## 2018-01-22 ENCOUNTER — CLINICAL SUPPORT (OUTPATIENT)
Dept: AUDIOLOGY | Facility: CLINIC | Age: 63
End: 2018-01-22
Payer: MEDICARE

## 2018-01-22 ENCOUNTER — OFFICE VISIT (OUTPATIENT)
Dept: OTOLARYNGOLOGY | Facility: CLINIC | Age: 63
End: 2018-01-22
Payer: MEDICARE

## 2018-01-22 VITALS — BODY MASS INDEX: 24.42 KG/M2 | HEIGHT: 62 IN | WEIGHT: 132.69 LBS

## 2018-01-22 DIAGNOSIS — H90.6 MIXED HEARING LOSS, BILATERAL: Primary | ICD-10-CM

## 2018-01-22 DIAGNOSIS — Z09 FOLLOW-UP EXAMINATION AFTER EAR SURGERY: Primary | ICD-10-CM

## 2018-01-22 PROCEDURE — 99211 OFF/OP EST MAY X REQ PHY/QHP: CPT | Mod: PBBFAC,27

## 2018-01-22 PROCEDURE — 99999 PR PBB SHADOW E&M-EST. PATIENT-LVL III: CPT | Mod: PBBFAC,,, | Performed by: OTOLARYNGOLOGY

## 2018-01-22 PROCEDURE — 92553 AUDIOMETRY AIR & BONE: CPT | Mod: PBBFAC | Performed by: AUDIOLOGIST

## 2018-01-22 PROCEDURE — 92555 SPEECH THRESHOLD AUDIOMETRY: CPT | Mod: PBBFAC | Performed by: AUDIOLOGIST

## 2018-01-22 PROCEDURE — 99213 OFFICE O/P EST LOW 20 MIN: CPT | Mod: PBBFAC | Performed by: OTOLARYNGOLOGY

## 2018-01-22 PROCEDURE — 99999 PR PBB SHADOW E&M-EST. PATIENT-LVL I: CPT | Mod: PBBFAC,,,

## 2018-01-22 PROCEDURE — 99024 POSTOP FOLLOW-UP VISIT: CPT | Mod: POP,,, | Performed by: OTOLARYNGOLOGY

## 2018-01-22 NOTE — PROGRESS NOTES
4 mos S/P Left lat graft T plasty. (9/17/17)    Pt doing well post op.  No unusual pain or drainage. No significant vertigo or nausea.    Post auricular incision healing well.  Packing vacuumed out of ear with microscope.  Graft intact and healing well.  Patient tolerated well.            AUDIO 01/22/2018      RTC prn

## 2018-01-22 NOTE — PROGRESS NOTES
Tor Perez was referred by Dr. Shetty for an audiological evaluation today.    Audiological testing revealed a mild mixed hearing loss, AU.  A speech reception threshold was obtained at 20 dBHL for the right ear and at 35 dBHL for the left ear.  Speech discrimination testing could not be completed as Ms. Perez does not speak English well enough, as Azeri is her primary language.        Recommendations:  1. Otologic evaluation  2. Annual hearing evaluation  3. Hearing protection when in noise   4. Hearing aid consultation following medical clearance

## 2018-04-19 PROBLEM — N18.6 ESRD (END STAGE RENAL DISEASE): Status: ACTIVE | Noted: 2018-04-19

## 2018-04-19 PROBLEM — J81.1 NONCARDIAC PULMONARY EDEMA: Status: ACTIVE | Noted: 2018-04-19

## 2018-04-30 PROBLEM — J81.1 NONCARDIAC PULMONARY EDEMA: Status: ACTIVE | Noted: 2018-04-30

## 2018-07-02 ENCOUNTER — TELEPHONE (OUTPATIENT)
Dept: TRANSPLANT | Facility: CLINIC | Age: 63
End: 2018-07-02

## 2020-11-08 PROBLEM — T82.838A BLEEDING PSEUDOANEURYSM OF LEFT BRACHIOCEPHALIC AV FISTULA: Status: ACTIVE | Noted: 2020-11-08

## 2020-11-08 PROBLEM — T82.838A: Status: ACTIVE | Noted: 2020-11-08

## 2020-11-08 PROBLEM — E11.9 DIABETES MELLITUS: Status: ACTIVE | Noted: 2020-11-08

## 2020-11-08 PROBLEM — I10 HTN (HYPERTENSION): Status: ACTIVE | Noted: 2020-11-08

## 2020-11-08 PROBLEM — G47.00 INSOMNIA: Status: ACTIVE | Noted: 2020-11-08

## 2020-11-08 PROBLEM — E83.39 HYPERPHOSPHATEMIA: Status: ACTIVE | Noted: 2020-11-08

## 2020-11-08 PROBLEM — N18.6 ESRD (END STAGE RENAL DISEASE): Status: ACTIVE | Noted: 2020-11-08

## 2020-11-09 PROBLEM — Z99.2 ESRD ON HEMODIALYSIS: Status: ACTIVE | Noted: 2020-11-08

## 2021-05-12 ENCOUNTER — HISTORICAL (OUTPATIENT)
Dept: ADMINISTRATIVE | Facility: HOSPITAL | Age: 66
End: 2021-05-12

## 2021-05-18 LAB — FINAL CULTURE: NORMAL

## 2021-09-28 ENCOUNTER — HISTORICAL (OUTPATIENT)
Dept: ADMINISTRATIVE | Facility: HOSPITAL | Age: 66
End: 2021-09-28

## 2021-10-04 LAB
FINAL CULTURE: NORMAL
FINAL CULTURE: NORMAL

## 2021-12-07 ENCOUNTER — HISTORICAL (OUTPATIENT)
Dept: ADMINISTRATIVE | Facility: HOSPITAL | Age: 66
End: 2021-12-07

## 2021-12-07 LAB
ABS NEUT (OLG): 2.27 X10(3)/MCL (ref 2.1–9.2)
BASOPHILS # BLD AUTO: 0.1 X10(3)/MCL (ref 0–0.2)
BASOPHILS NFR BLD AUTO: 1.4 %
EOSINOPHIL # BLD AUTO: 0.3 X10(3)/MCL (ref 0–0.9)
EOSINOPHIL NFR BLD AUTO: 6.7 %
ERYTHROCYTE [DISTWIDTH] IN BLOOD BY AUTOMATED COUNT: 18.9 % (ref 11.5–17)
HCT VFR BLD AUTO: 39.4 % (ref 37–47)
HGB BLD-MCNC: 12.2 GM/DL (ref 12–16)
LYMPHOCYTES # BLD AUTO: 1.3 X10(3)/MCL (ref 0.6–4.6)
LYMPHOCYTES NFR BLD AUTO: 29 %
MCH RBC QN AUTO: 24.1 PG (ref 27–31)
MCHC RBC AUTO-ENTMCNC: 31 GM/DL (ref 33–36)
MCV RBC AUTO: 77.9 FL (ref 80–94)
MONOCYTES # BLD AUTO: 0.4 X10(3)/MCL (ref 0.1–1.3)
MONOCYTES NFR BLD AUTO: 10.4 %
NEUTROPHILS # BLD AUTO: 2.3 X10(3)/MCL (ref 2.1–9.2)
NEUTROPHILS NFR BLD AUTO: 52.3 %
PLATELET # BLD AUTO: 109 X10(3)/MCL (ref 130–400)
PMV BLD AUTO: ABNORMAL FL (ref 9.4–12.4)
PTH-INTACT SERPL-MCNC: 507.9 PG/ML (ref 8.7–77.1)
RBC # BLD AUTO: 5.06 X10(6)/MCL (ref 4.2–5.4)
WBC # SPEC AUTO: 4.3 X10(3)/MCL (ref 4.5–11.5)

## 2022-02-07 ENCOUNTER — HISTORICAL (OUTPATIENT)
Dept: ADMINISTRATIVE | Facility: HOSPITAL | Age: 67
End: 2022-02-07

## 2022-04-18 ENCOUNTER — HOSPITAL ENCOUNTER (OUTPATIENT)
Dept: RADIOLOGY | Facility: HOSPITAL | Age: 67
Discharge: HOME | End: 2022-04-18
Attending: STUDENT IN AN ORGANIZED HEALTH CARE EDUCATION/TRAINING PROGRAM
Payer: MEDICARE

## 2022-04-18 ENCOUNTER — OFFICE VISIT (OUTPATIENT)
Dept: OBSTETRICS AND GYNECOLOGY | Facility: CLINIC | Age: 67
End: 2022-04-18
Payer: MEDICARE

## 2022-04-18 VITALS
BODY MASS INDEX: 22.52 KG/M2 | WEIGHT: 122.38 LBS | SYSTOLIC BLOOD PRESSURE: 150 MMHG | HEIGHT: 62 IN | DIASTOLIC BLOOD PRESSURE: 76 MMHG

## 2022-04-18 DIAGNOSIS — Z12.31 ENCOUNTER FOR SCREENING MAMMOGRAM FOR MALIGNANT NEOPLASM OF BREAST: ICD-10-CM

## 2022-04-18 DIAGNOSIS — Z01.419 WELL WOMAN EXAM: ICD-10-CM

## 2022-04-18 DIAGNOSIS — Z01.419 WELL WOMAN EXAM: Primary | ICD-10-CM

## 2022-04-18 PROCEDURE — G0101 CA SCREEN;PELVIC/BREAST EXAM: HCPCS | Mod: S$PBB,,, | Performed by: STUDENT IN AN ORGANIZED HEALTH CARE EDUCATION/TRAINING PROGRAM

## 2022-04-18 PROCEDURE — 77063 MAMMO DIGITAL SCREENING BILAT WITH TOMO: ICD-10-PCS | Mod: 26,,, | Performed by: RADIOLOGY

## 2022-04-18 PROCEDURE — 77067 SCR MAMMO BI INCL CAD: CPT | Mod: TC,PN

## 2022-04-18 PROCEDURE — 77067 SCR MAMMO BI INCL CAD: CPT | Mod: 26,,, | Performed by: RADIOLOGY

## 2022-04-18 PROCEDURE — 77063 BREAST TOMOSYNTHESIS BI: CPT | Mod: 26,,, | Performed by: RADIOLOGY

## 2022-04-18 PROCEDURE — 88175 CYTOPATH C/V AUTO FLUID REDO: CPT | Performed by: STUDENT IN AN ORGANIZED HEALTH CARE EDUCATION/TRAINING PROGRAM

## 2022-04-18 PROCEDURE — 99999 PR PBB SHADOW E&M-EST. PATIENT-LVL IV: CPT | Mod: PBBFAC,,, | Performed by: STUDENT IN AN ORGANIZED HEALTH CARE EDUCATION/TRAINING PROGRAM

## 2022-04-18 PROCEDURE — 87624 HPV HI-RISK TYP POOLED RSLT: CPT | Performed by: STUDENT IN AN ORGANIZED HEALTH CARE EDUCATION/TRAINING PROGRAM

## 2022-04-18 PROCEDURE — 77063 BREAST TOMOSYNTHESIS BI: CPT | Mod: TC,PN

## 2022-04-18 PROCEDURE — 99214 OFFICE O/P EST MOD 30 MIN: CPT | Mod: PBBFAC,PN | Performed by: STUDENT IN AN ORGANIZED HEALTH CARE EDUCATION/TRAINING PROGRAM

## 2022-04-18 PROCEDURE — G0101 PR CA SCREEN;PELVIC/BREAST EXAM: ICD-10-PCS | Mod: S$PBB,,, | Performed by: STUDENT IN AN ORGANIZED HEALTH CARE EDUCATION/TRAINING PROGRAM

## 2022-04-18 PROCEDURE — 77067 MAMMO DIGITAL SCREENING BILAT WITH TOMO: ICD-10-PCS | Mod: 26,,, | Performed by: RADIOLOGY

## 2022-04-18 PROCEDURE — 99999 PR PBB SHADOW E&M-EST. PATIENT-LVL IV: ICD-10-PCS | Mod: PBBFAC,,, | Performed by: STUDENT IN AN ORGANIZED HEALTH CARE EDUCATION/TRAINING PROGRAM

## 2022-04-18 NOTE — PROGRESS NOTES
History & Physical  Gynecology      SUBJECTIVE:     Chief Complaint: Well Woman, Annual Exam, and Establish Care       History of Present Illness:    Here today with daughter for well woman exam. Needs pap and MMG, being worked up for transplant at  in [place]. DECLINEs kenia .    Gyn: no PMB, no HRT, no hx of abnormal pap. No immediate FH of gyn or colon cancer. Not sexually active.     ESRD, HD MWF.     Hx of breast cyst removal.       Review of patient's allergies indicates:  No Known Allergies    Past Medical History:   Diagnosis Date    CVA (cerebral vascular accident)     Diabetes mellitus     Disorder of kidney and ureter     Hypertension     Stroke      Past Surgical History:   Procedure Laterality Date     SECTION      KIDNEY TRANSPLANT Right     FAILED 7 YEARS AGO     KIDNEY TRANSPLANT      REVISION OF ARTERIOVENOUS FISTULA Left 2020    Procedure: AV FISTULA  AND ANEURYSM REPAIR WITH FISTULOGRAM-LEFT ARM;  Surgeon: Anthony Deleon MD;  Location: [facility] OR;  Service: Vascular;  Laterality: Left;     OB History    No obstetric history on file.       Family History   Problem Relation Age of Onset    Hypertension Mother     Kidney disease Father     Breast cancer Neg Hx     Ovarian cancer Neg Hx      Social History     Tobacco Use    Smoking status: Never Smoker    Smokeless tobacco: Never Used   Substance Use Topics    Alcohol use: Not Currently    Drug use: Not Currently       Current Outpatient Medications   Medication Sig    amitriptyline (ELAVIL) 25 MG tablet Take 1 tablet (25 mg total) by mouth nightly as needed for Insomnia.    amlodipine (NORVASC) 10 MG tablet Take 10 mg by mouth every morning.     calcitRIOL (ROCALTROL) 0.5 MCG Cap Take 1 capsule (0.5 mcg total) by mouth 2 (two) times daily.    carvediloL (COREG) 12.5 MG tablet Take 2 tablets (25 mg total) by mouth 2 (two) times daily with meals.    sevelamer HCL (RENAGEL) 800 MG Tab Take 1 tablet  (800 mg total) by mouth 3 (three) times daily with meals. Home instructions say take 4 tablets TID (?)    amitriptyline (ELAVIL) 25 MG tablet Take 25 mg by mouth nightly as needed for Insomnia.    amLODIPine (NORVASC) 10 MG tablet Take 1 tablet (10 mg total) by mouth once daily. (Patient not taking: Reported on 4/18/2022)    calcitRIOL (ROCALTROL) 0.5 MCG Cap Take 0.5 mcg by mouth every morning.     carvedilol (COREG) 6.25 MG tablet Take 6.25 mg by mouth 2 (two) times daily with meals.    CINACALCET HCL (SENSIPAR ORAL) Take 1 tablet by mouth nightly. Daughter does not know the strength    HYDROcodone-acetaminophen (NORCO) 5-325 mg per tablet Take 1 tablet by mouth every 12 (twelve) hours as needed for Pain. (Patient not taking: Reported on 4/18/2022)    meclizine (ANTIVERT) 25 mg tablet Take 1 tablet (25 mg total) by mouth 3 (three) times daily as needed for Dizziness or Nausea. (Patient not taking: Reported on 4/18/2022)    oxycodone-acetaminophen (PERCOCET) 7.5-325 mg per tablet Take 1 tablet by mouth every 4 (four) hours as needed for Pain. (Patient not taking: Reported on 4/18/2022)    promethazine (PHENERGAN) 25 MG tablet Take 1 tablet (25 mg total) by mouth every 4 (four) hours as needed for Nausea. (Patient not taking: Reported on 4/18/2022)    sevelamer HCl (RENAGEL) 800 MG Tab Take 800 mg by mouth 3 (three) times daily with meals.    temazepam (RESTORIL) 30 mg capsule Take 1 capsule (30 mg total) by mouth nightly as needed for Insomnia. (Patient not taking: Reported on 4/18/2022)     No current facility-administered medications for this visit.         Review of Systems:  Review of Systems   Constitutional: Negative for chills, fatigue and fever.   HENT: Negative for congestion.    Eyes: Negative for visual disturbance.   Respiratory: Negative for cough and shortness of breath.    Cardiovascular: Negative for chest pain and palpitations.   Gastrointestinal: Negative for abdominal distention,  abdominal pain, constipation, diarrhea, nausea and vomiting.   Genitourinary: Negative for difficulty urinating, dysuria, hematuria, vaginal bleeding and vaginal discharge.   Skin: Negative for rash.   Neurological: Negative for dizziness, seizures, light-headedness and headaches.   Hematological: Does not bruise/bleed easily.   Psychiatric/Behavioral: Negative for dysphoric mood. The patient is not nervous/anxious.         OBJECTIVE:     Physical Exam:  Physical Exam  Vitals reviewed.   Constitutional:       General: She is not in acute distress.     Appearance: She is well-developed.   HENT:      Head: Normocephalic and atraumatic.   Cardiovascular:      Rate and Rhythm: Normal rate and regular rhythm.   Pulmonary:      Effort: Pulmonary effort is normal.   Chest:   Breasts:      Right: No inverted nipple, mass, nipple discharge, skin change or tenderness.      Left: No inverted nipple, mass, nipple discharge, skin change or tenderness.        Comments: Dense breast tissue palpated form 1 to 3oclock no mass palpated  Abdominal:      General: There is no distension.      Palpations: Abdomen is soft.   Genitourinary:     Vagina: Normal.      Comments: Normal external female genitalia, normal hair distribution. Vaginal mucosa pink, moist, well rugated, scant white physiologic discharge. No blood in vault. Cervix pink, non-friable, without lesion. No CMT. Uterus non tender, mobile, not enlarged. Adnexa without fullness or tenderness.    Skin:     General: Skin is warm.   Neurological:      Mental Status: She is alert and oriented to person, place, and time.   Psychiatric:         Behavior: Behavior normal.         Thought Content: Thought content normal.         Judgment: Judgment normal.           ASSESSMENT:       ICD-10-CM ICD-9-CM    1. Well woman exam  Z01.419 V72.31 Liquid-Based Pap Smear, Screening      HPV High Risk Genotypes, PCR      CANCELED: Mammo Digital Screening Bilat   2. Encounter for screening  mammogram for malignant neoplasm of breast   Z12.31 V76.12 CANCELED: Mammo Digital Screening Bilat       Orders Placed This Encounter   Procedures    HPV High Risk Genotypes, PCR     Release to patient->Immediate     Order Specific Question:   Source     Answer:   Cervix     Order Specific Question:   Release to patient     Answer:   Immediate           Plan:      Well woman  - Pap cotest   - MMG ordered   - colonoscopy s/p   - tobacco cessation n/a  - contraception n/a  - HPV vaccine n/an  - Safe Sex n/a  - Counseled to take daily multivitamin. If patient is of reproductive age and not on contraception, to take prenatal vitamin. Patient has been counseled on the vitamin D and calcium requirements per ACOG recommendations.  Age   Calcium(mg/day)   Vitamin D (IU/day)  9-18    1300                       600  19-50  1000                       600  51-70  1200                       600  >70      1,200                      800  Patient deferred, lizabeth managed with DEVONTE Lee M.D.  Obstetrics and Gynecology

## 2022-04-24 LAB
FINAL PATHOLOGIC DIAGNOSIS: NORMAL
Lab: NORMAL

## 2022-04-25 LAB
HPV HR 12 DNA SPEC QL NAA+PROBE: NEGATIVE
HPV16 AG SPEC QL: NEGATIVE
HPV18 DNA SPEC QL NAA+PROBE: NEGATIVE

## (undated) DEVICE — BURR CUTT CARB 3X38 E9000 FLUT

## (undated) DEVICE — ELECTRODE REM PLYHSV RETURN 9

## (undated) DEVICE — SEE MEDLINE ITEM 152622

## (undated) DEVICE — SUT BONE WAX 2.5 GRMS 12/BX

## (undated) DEVICE — DRESSING GLASSCOCK PED MASTOID

## (undated) DEVICE — SOL IRR NACL .9% 3000ML

## (undated) DEVICE — SUT 3/0 18IN COATED VICRYLP

## (undated) DEVICE — CLOSURE SKIN STERI STRIP 1/2X4

## (undated) DEVICE — BLADE SURG CARBON STEEL SZ11

## (undated) DEVICE — SEE MEDLINE ITEM 157128

## (undated) DEVICE — BLADE SCALP OPTHL NDL TIP 3MM

## (undated) DEVICE — CLIPPER BLADE MOD 4406 (CAREF)

## (undated) DEVICE — BLADE SCALP OPHTL RND TIP

## (undated) DEVICE — BLADE OTOLOGY BEAVER 5X84MM

## (undated) DEVICE — Device

## (undated) DEVICE — SOL IRR WATER STRL 3000 ML

## (undated) DEVICE — BURR STEEL ROUND E9000 2X48MM

## (undated) DEVICE — KIT DRESS GLASSCK EAR ADLT ST

## (undated) DEVICE — SEE MEDLINE ITEM 146373

## (undated) DEVICE — SUCTION SURGICAL FRAZR

## (undated) DEVICE — KIT SUCTION IRRIGATION

## (undated) DEVICE — KIT EAR EAOFE

## (undated) DEVICE — BIT DRILL TUBING